# Patient Record
Sex: FEMALE | Race: WHITE | NOT HISPANIC OR LATINO
[De-identification: names, ages, dates, MRNs, and addresses within clinical notes are randomized per-mention and may not be internally consistent; named-entity substitution may affect disease eponyms.]

---
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
## 2025-01-20 ENCOUNTER — EMERGENCY (EMERGENCY)
Facility: HOSPITAL | Age: 20
LOS: 1 days | Discharge: HOME | End: 2025-01-20
Attending: EMERGENCY MEDICINE | Admitting: EMERGENCY MEDICINE
Payer: MEDICAID

## 2025-01-20 VITALS
DIASTOLIC BLOOD PRESSURE: 73 MMHG | RESPIRATION RATE: 18 BRPM | SYSTOLIC BLOOD PRESSURE: 108 MMHG | HEIGHT: 60 IN | WEIGHT: 100.09 LBS | OXYGEN SATURATION: 100 % | TEMPERATURE: 97 F | HEART RATE: 84 BPM

## 2025-01-20 LAB
ALBUMIN SERPL ELPH-MCNC: 3.8 G/DL — SIGNIFICANT CHANGE UP (ref 3.3–5)
ALP SERPL-CCNC: 80 U/L — SIGNIFICANT CHANGE UP (ref 40–120)
ALT FLD-CCNC: 14 U/L — SIGNIFICANT CHANGE UP (ref 10–45)
ANION GAP SERPL CALC-SCNC: 12 MMOL/L — SIGNIFICANT CHANGE UP (ref 5–17)
APAP SERPL-MCNC: <1 UG/ML — LOW (ref 10–30)
AST SERPL-CCNC: 14 U/L — SIGNIFICANT CHANGE UP (ref 10–40)
BASOPHILS # BLD AUTO: 0.07 K/UL — SIGNIFICANT CHANGE UP (ref 0–0.2)
BASOPHILS NFR BLD AUTO: 0.6 % — SIGNIFICANT CHANGE UP (ref 0–2)
BILIRUB SERPL-MCNC: 0.5 MG/DL — SIGNIFICANT CHANGE UP (ref 0.2–1.2)
BUN SERPL-MCNC: 15 MG/DL — SIGNIFICANT CHANGE UP (ref 7–23)
CALCIUM SERPL-MCNC: 9.4 MG/DL — SIGNIFICANT CHANGE UP (ref 8.4–10.5)
CHLORIDE SERPL-SCNC: 104 MMOL/L — SIGNIFICANT CHANGE UP (ref 96–108)
CO2 SERPL-SCNC: 21 MMOL/L — LOW (ref 22–31)
CREAT SERPL-MCNC: 0.63 MG/DL — SIGNIFICANT CHANGE UP (ref 0.5–1.3)
EGFR: 131 ML/MIN/1.73M2 — SIGNIFICANT CHANGE UP
EOSINOPHIL # BLD AUTO: 0.22 K/UL — SIGNIFICANT CHANGE UP (ref 0–0.5)
EOSINOPHIL NFR BLD AUTO: 2 % — SIGNIFICANT CHANGE UP (ref 0–6)
ETHANOL SERPL-MCNC: <3 MG/DL — SIGNIFICANT CHANGE UP (ref 0–3)
FLUAV AG NPH QL: SIGNIFICANT CHANGE UP
FLUBV AG NPH QL: SIGNIFICANT CHANGE UP
GLUCOSE SERPL-MCNC: 101 MG/DL — HIGH (ref 70–99)
HCG SERPL-ACNC: <1 MIU/ML — SIGNIFICANT CHANGE UP
HCT VFR BLD CALC: 36.8 % — SIGNIFICANT CHANGE UP (ref 34.5–45)
HGB BLD-MCNC: 12.3 G/DL — SIGNIFICANT CHANGE UP (ref 11.5–15.5)
IMM GRANULOCYTES NFR BLD AUTO: 0.4 % — SIGNIFICANT CHANGE UP (ref 0–0.9)
LYMPHOCYTES # BLD AUTO: 2.38 K/UL — SIGNIFICANT CHANGE UP (ref 1–3.3)
LYMPHOCYTES # BLD AUTO: 21.6 % — SIGNIFICANT CHANGE UP (ref 13–44)
MCHC RBC-ENTMCNC: 28.3 PG — SIGNIFICANT CHANGE UP (ref 27–34)
MCHC RBC-ENTMCNC: 33.4 G/DL — SIGNIFICANT CHANGE UP (ref 32–36)
MCV RBC AUTO: 84.6 FL — SIGNIFICANT CHANGE UP (ref 80–100)
MONOCYTES # BLD AUTO: 0.76 K/UL — SIGNIFICANT CHANGE UP (ref 0–0.9)
MONOCYTES NFR BLD AUTO: 6.9 % — SIGNIFICANT CHANGE UP (ref 2–14)
NEUTROPHILS # BLD AUTO: 7.54 K/UL — HIGH (ref 1.8–7.4)
NEUTROPHILS NFR BLD AUTO: 68.5 % — SIGNIFICANT CHANGE UP (ref 43–77)
NRBC # BLD: 0 /100 WBCS — SIGNIFICANT CHANGE UP (ref 0–0)
PLATELET # BLD AUTO: 406 K/UL — HIGH (ref 150–400)
POTASSIUM SERPL-MCNC: 3.7 MMOL/L — SIGNIFICANT CHANGE UP (ref 3.5–5.3)
POTASSIUM SERPL-SCNC: 3.7 MMOL/L — SIGNIFICANT CHANGE UP (ref 3.5–5.3)
PROT SERPL-MCNC: 7.2 G/DL — SIGNIFICANT CHANGE UP (ref 6–8.3)
RBC # BLD: 4.35 M/UL — SIGNIFICANT CHANGE UP (ref 3.8–5.2)
RBC # FLD: 14.5 % — SIGNIFICANT CHANGE UP (ref 10.3–14.5)
RSV RNA NPH QL NAA+NON-PROBE: SIGNIFICANT CHANGE UP
SALICYLATES SERPL-MCNC: 1 MG/DL — LOW (ref 3–20)
SARS-COV-2 RNA SPEC QL NAA+PROBE: SIGNIFICANT CHANGE UP
SODIUM SERPL-SCNC: 137 MMOL/L — SIGNIFICANT CHANGE UP (ref 135–145)
WBC # BLD: 11.01 K/UL — HIGH (ref 3.8–10.5)
WBC # FLD AUTO: 11.01 K/UL — HIGH (ref 3.8–10.5)

## 2025-01-20 PROCEDURE — 93005 ELECTROCARDIOGRAM TRACING: CPT

## 2025-01-20 PROCEDURE — 36415 COLL VENOUS BLD VENIPUNCTURE: CPT

## 2025-01-20 PROCEDURE — 85025 COMPLETE CBC W/AUTO DIFF WBC: CPT

## 2025-01-20 PROCEDURE — 80053 COMPREHEN METABOLIC PANEL: CPT

## 2025-01-20 PROCEDURE — 99285 EMERGENCY DEPT VISIT HI MDM: CPT

## 2025-01-20 PROCEDURE — 80307 DRUG TEST PRSMV CHEM ANLYZR: CPT

## 2025-01-20 PROCEDURE — 87637 SARSCOV2&INF A&B&RSV AMP PRB: CPT

## 2025-01-20 PROCEDURE — 90792 PSYCH DIAG EVAL W/MED SRVCS: CPT | Mod: 95

## 2025-01-20 PROCEDURE — 99285 EMERGENCY DEPT VISIT HI MDM: CPT | Mod: 25

## 2025-01-20 PROCEDURE — 84702 CHORIONIC GONADOTROPIN TEST: CPT

## 2025-01-20 PROCEDURE — 93010 ELECTROCARDIOGRAM REPORT: CPT

## 2025-01-20 NOTE — ED BEHAVIORAL HEALTH ASSESSMENT NOTE - DETAILS
n/a SW and provider spoke with mother denies father - schizoaffective disorder discussed with patient and reviewed with patient and mother. per chart review: lamictal w/ rash, prozac w/ syncopal episodes/hypotension; also allergic to amoxicillin, penicillin
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
## 2025-01-20 NOTE — ED BEHAVIORAL HEALTH ASSESSMENT NOTE - RISK ASSESSMENT
patient is at low/moderate risk of self harm. RF include hx of self-harm and suicide attempts, poor frustration tolerance, hx of substance use, irritability and impulsivity. PF which help mitigate this risk include no active suicidal ideation, future oriented, identifies reasons to live, strong support network, able to safety plan, access to care.

## 2025-01-20 NOTE — ED BEHAVIORAL HEALTH NOTE - BEHAVIORAL HEALTH NOTE
========================    FOR EACH COLLATERAL    ========================    NAME:    NUMBER:    RELATIONSHIP:    RELIABILITY:    COMMENTS:      ========================    PATIENT DEMOGRAPHICS:    ========================      ========================    HPI    ========================    BASELINE FUNCTIONING:    DATE HPI STARTED:    DECOMPENSATION:    SUICIDALITY    VIOLENCE:    SUBSTANCE:        ========================    PAST PSYCHIATRIC HISTORY    ========================    DATE PAST PSYCHIATRIC HISTORY STARTED:    MAIN PSYCHIATRIC DIAGNOSIS:    PSYCHIATRIC HOSPITALIZATIONS:    PRIOR ILLNESS:    SUICIDALITY:    VIOLENCE:    SUBSTANCE USE:      ==============    OTHER HISTORY    ==============    CURRENT MEDICATION:    MEDICAL HISTORY:    ALLERGIES    LEGAL ISSUES:    FIREARM ACCESS:    SOCIAL HISTORY:    FAMILY HISTORY:    DEVELOPMENTAL HISTORY:

## 2025-01-20 NOTE — ED PROVIDER NOTE - CLINICAL SUMMARY MEDICAL DECISION MAKING FREE TEXT BOX
00 20: Received signout from Dr. PatelJun [id number], follow-up telepsych consult, dispo per psych.  Spoke with attending telepsychiatrist Dr. Rowland, there is no indication for involuntary admission.  They have set up outpatient appointment with patient at [facility].  Father here to  patient.  Dr. Ivan

## 2025-01-20 NOTE — ED ADULT NURSE REASSESSMENT NOTE - NS ED NURSE REASSESS COMMENT FT1
Informed by MD Luong that collateral provided by family provided that patient made statements endorsing suicidal ideation. Patient continues to deny ideation or plan at present. Informed by MD Luong that she is to be assessed by TelePsych and plan of care for ED visit explained. Patient verbally and visibly upset. Patient changed into yellow gown and red socks at this time per protocol. Patient's belongings collected; x2 bags in security closet shelf. 1 to 1 initiated as ordered and remains at bedside. Verbal de-escalation and consoling provided. Informed by MD Luong that collateral provided by family provided that patient made statements endorsing suicidal ideation. Patient continues to deny ideation or plan at present. Patient informed by MD Luong that she is to be assessed by TelePsych and plan of care for ED visit explained with RN and MD present. Patient verbally and visibly upset. Patient changed into yellow gown and red socks at this time per protocol. Patient's belongings collected; x2 bags in security closet shelf. 1 to 1 initiated as ordered and remains at bedside. Verbal de-escalation and consoling provided. Informed by MD Luong that collateral provided by family stated that patient made statements endorsing suicidal ideation. Patient continues to deny ideation or plan at present. Patient informed by MD Luong that she is to be assessed by TelePsych and plan of care for ED visit explained with RN and MD present. Patient verbally and visibly upset. Patient changed into yellow gown and red socks at this time per protocol. Patient's belongings collected; x2 bags in security closet shelf. 1 to 1 initiated as ordered and remains at bedside. Verbal de-escalation and consoling provided.

## 2025-01-20 NOTE — ED BEHAVIORAL HEALTH ASSESSMENT NOTE - ADDITIONAL DETAILS ALL
prior overdose attempt in 2021 on hydroxyzine, 6-7 tabs; prior self-harm in 12/2024 in context of argument with boyfriend

## 2025-01-20 NOTE — ED BEHAVIORAL HEALTH ASSESSMENT NOTE - NSBHSABEN_PSY_A_CORE FT
hx use of street Xanax up to 6 mg daily, reports outpatient taper with private psychiatrist using 3 mg daily (unable to verify on ISTOP)

## 2025-01-20 NOTE — ED ADULT TRIAGE NOTE - CHIEF COMPLAINT QUOTE
Patient brought in by [facility] after having a argument with mother and having anxiety. Denies any SI/HI or auditory or visual hallucinations
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
## 2025-01-20 NOTE — ED PROVIDER NOTE - PATIENT PORTAL LINK FT
You can access the FollowMyHealth Patient Portal offered by [facility] by registering at the following website: [email or web address]. By joining [employer]’s FollowMyHealth portal, you will also be able to view your health information using other applications (apps) compatible with our system.
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
## 2025-01-20 NOTE — ED PROVIDER NOTE - NSFOLLOWUPINSTRUCTIONS_ED_ALL_ED_FT
-Follow-up with your outpatient psychiatric appointment in [place] tomorrow as scheduled for you.  See information on papers given to you    Anxiety    WHAT YOU NEED TO KNOW:    Anxiety is a condition that causes you to feel extremely worried or nervous. The feelings are so strong that they can cause problems with your daily activities or sleep. Anxiety may be triggered by something you fear, or it may happen without a cause. Family or work stress, smoking, caffeine, and alcohol can increase your risk for anxiety. Certain medicines or health conditions can also increase your risk. Anxiety can become a long-term condition if it is not managed or treated.     DISCHARGE INSTRUCTIONS:    Call 911 if:     You have chest pain, tightness, or heaviness that may spread to your shoulders, arms, jaw, neck, or back.      You feel like hurting yourself or someone else.     Contact your healthcare provider if:     Your symptoms get worse or do not get better with treatment.      Your anxiety keeps you from doing your regular daily activities.      You have new symptoms since your last visit.      You have questions or concerns about your condition or care.    Medicines:     Medicines may be given to help you feel more calm and relaxed, and decrease your symptoms.      Take your medicine as directed. Contact your healthcare provider if you think your medicine is not helping or if you have side effects. Tell him of her if you are allergic to any medicine. Keep a list of the medicines, vitamins, and herbs you take. Include the amounts, and when and why you take them. Bring the list or the pill bottles to follow-up visits. Carry your medicine list with you in case of an emergency.    Follow up with your healthcare provider within 2 weeks or as directed: Write down your questions so you remember to ask them during your visits.    Manage anxiety:     Talk to someone about your anxiety. Your healthcare provider may suggest counseling. Cognitive behavioral therapy can help you understand and change how you react to events that trigger your symptoms. You might feel more comfortable talking with a friend or family member about your anxiety. Choose someone you know will be supportive and encouraging.      Find ways to relax. Activities such as exercise, meditation, or listening to music can help you relax. Spend time with friends, or do things you enjoy.      Practice deep breathing. Deep breathing can help you relax when you feel anxious. Focus on taking slow, deep breaths several times a day, or during an anxiety attack. Breathe in through your nose and out through your mouth.       Create a regular sleep routine. Regular sleep can help you feel calmer during the day. Go to sleep and wake up at the same times every day. Do not watch television or use the computer right before bed. Your room should be comfortable, dark, and quiet.       Eat a variety of healthy foods. Healthy foods include fruits, vegetables, low-fat dairy products, lean meats, fish, whole-grain breads, and cooked beans. Healthy foods can help you feel less anxious and have more energy.      Exercise regularly. Exercise can increase your energy level. Exercise may also lift your mood and help you sleep better. Your healthcare provider can help you create an exercise plan.      Do not smoke. Nicotine and other chemicals in cigarettes and cigars can increase anxiety. Ask your healthcare provider for information if you currently smoke and need help to quit. E-cigarettes or smokeless tobacco still contain nicotine. Talk to your healthcare provider before you use these products.       Do not have caffeine. Caffeine can make your symptoms worse. Do not have foods or drinks that are meant to increase your energy level.      Limit or do not drink alcohol. Ask your healthcare provider if alcohol is safe for you. You may not be able to drink alcohol if you take certain anxiety or depression medicines. Limit alcohol to 1 drink per day if you are a woman. Limit alcohol to 2 drinks per day if you are a man. A drink of alcohol is 12 ounces of beer, 5 ounces of wine, or 1½ ounces of liquor.       Do not use drugs. Drugs can make your anxiety worse. It can also make anxiety hard to manage. Talk to your healthcare provider if you use drugs and want help to quit.      Depression    WHAT YOU NEED TO KNOW:    Depression is a medical condition that causes feelings of sadness or hopelessness that do not go away. Depression may cause you to lose interest in things you used to enjoy. These feelings may interfere with your daily life.    DISCHARGE INSTRUCTIONS:    Call your local emergency number (911 in the [place]) if:   •You think about harming yourself or someone else.      •You have done something on purpose to hurt yourself.      Call your therapist or doctor if:   •Your symptoms do not improve.      •You cannot make it to your next appointment.       •You have new symptoms.      •You have questions or concerns about your condition or care.      The following resources are available at any time to help you, if needed:   •For the National Suicide Prevention Lifeline, call either of the following: ?988      ?1-460.792.7242 (1-800-273-TALK)      •For the Suicide Hotline, call 1-278.579.7605 (6-269-HAWFBEO)      •For a list of international numbers: https://save.org/find-help/international-resources/      Medicines:   •Antidepressants may be given to improve or balance your mood. You may need to take this medicine for several weeks before you begin to feel better.      •Take your medicine as directed. Contact your healthcare provider if you think your medicine is not helping or if you have side effects. Tell him or her if you are allergic to any medicine. Keep a list of the medicines, vitamins, and herbs you take. Include the amounts, and when and why you take them. Bring the list or the pill bottles to follow-up visits. Carry your medicine list with you in case of an emergency.      Therapy is often used together with medicine to relieve depression. Therapy is a way for you to talk about your feelings and anything that may be causing depression. Therapy can be done alone or in a group. It may also be done with family members or a significant other.    Self-care:   •Get regular physical activity. Try to be active for 30 minutes, 3 to 5 days a week. Physical activity can help relieve depression. Work with your healthcare provider to develop a plan that you enjoy. It may help to ask someone to be active with you.      •Create a regular sleep schedule. A routine can help you relax before bed. Listen to music, read, or do yoga. Try to go to bed and wake up at the same time every day. Sleep is important for emotional health.      •Eat a variety of healthy foods. Healthy foods include fruits, vegetables, whole-grain breads, low-fat dairy products, lean meats, fish, and cooked beans. A healthy meal plan is low in fat, salt, and added sugar.      •Do not drink alcohol or use drugs. Alcohol and drugs can make depression worse. Talk to your therapist or doctor if you need help quitting.      Follow up with your healthcare provider as directed: Your healthcare provider will monitor your progress at follow-up visits. He or she will also monitor your medicine if you take antidepressants. Your healthcare provider will ask if the medicine is helping. Tell him or her about any side effects or problems you may have with your medicine. The type or amount of medicine may need to be changed. Write down your questions so you remember to ask them during your visits

## 2025-01-20 NOTE — ED ADULT NURSE NOTE - NS_ED_NURSE_TEACHING_TOPIC_ED_A_ED
Appointment information provided. Follow up with Primary Care Physician. For new or worsening symptoms, please return to Emergency Department.

## 2025-01-20 NOTE — ED BEHAVIORAL HEALTH ASSESSMENT NOTE - OTHER PAST PSYCHIATRIC HISTORY (INCLUDE DETAILS REGARDING ONSET, COURSE OF ILLNESS, INPATIENT/OUTPATIENT TREATMENT)
Substance use hx opiate use disorder in sustained remission (since 9/2024), cocaine use disorder in early remission (1/2025), hx benzodiazepine dependence, cannabis dependence, PPH borderline personality disorder, unspecified bipolar disorder, PTSD, LUCIA, ADHD, MDD, two prior inpatient hospitalizations (most recently at [facility] in 2021 after overdose attempt); most recently in treatment with [facility] (per BONI in 2023), reportedly receiving care from private psychiatrist

## 2025-01-20 NOTE — ED BEHAVIORAL HEALTH ASSESSMENT NOTE - DESCRIPTION
Pt in good behavioral control, appropriate, was sleeping prior to interview    Vital Signs Last 24 Hrs  T(C): 36.2 (20 Jan 2025 19:17), Max: 36.2 (20 Jan 2025 19:17)  T(F): 97.2 (20 Jan 2025 19:17), Max: 97.2 (20 Jan 2025 19:17)  HR: 84 (20 Jan 2025 19:17) (84 - 84)  BP: 108/73 (20 Jan 2025 19:17) (108/73 - 108/73)  BP(mean): --  RR: 18 (20 Jan 2025 19:17) (18 - 18)  SpO2: 100% (20 Jan 2025 19:17) (100% - 100%)    Parameters below as of 20 Jan 2025 19:17  Patient On (Oxygen Delivery Method): room air None known Domiciled with parents and pets, graduated from , currently not in school, unemployed, interested in [employer] industry Domiciled with parents and pets, graduated from , currently not in school, unemployed, 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

## 2025-01-20 NOTE — ED BEHAVIORAL HEALTH ASSESSMENT NOTE - NS ED BHA DEMOGRAPHICS MEDICAL RECORD REVIEWED YES RECORDS
Hospital chart/[facility]/Psyckes Soolantra Pregnancy And Lactation Text: This medication is Pregnancy Category C. This medication is considered safe during breast feeding.

## 2025-01-20 NOTE — ED BEHAVIORAL HEALTH ASSESSMENT NOTE - DIFFERENTIAL
borderline personality disorder, polysubstance use disorder, cocaine withdrawal; r/o bipolar disorder

## 2025-01-20 NOTE — ED ADULT NURSE NOTE - OBJECTIVE STATEMENT
Patient received A&Ox4, ambulatory with steady gait at the present. Patient complains of new onset of anxiety s/p argument with mother. Patient with PMHx BPD, borderline personality. Patient calm and cooperative at present. Patient denies SI/HI/AH/VH. Side rails up, call light in reach, safety maintained, comfort measures provided and MD evaluation in progress.

## 2025-01-21 VITALS
OXYGEN SATURATION: 99 % | HEART RATE: 69 BPM | RESPIRATION RATE: 20 BRPM | DIASTOLIC BLOOD PRESSURE: 68 MMHG | TEMPERATURE: 97 F | SYSTOLIC BLOOD PRESSURE: 108 MMHG

## 2025-01-21 DIAGNOSIS — F60.3 BORDERLINE PERSONALITY DISORDER: ICD-10-CM

## 2025-01-21 DIAGNOSIS — F14.93 COCAINE USE, UNSPECIFIED WITH WITHDRAWAL: ICD-10-CM

## 2025-01-21 NOTE — BH SAFETY PLAN - STEP 3 DISTRACTION NAME
How Severe Are They?: mild Is This A New Presentation, Or A Follow-Up?: Skin Lesion . no history of blood product transfusion

## 2025-03-02 ENCOUNTER — EMERGENCY (EMERGENCY)
Facility: HOSPITAL | Age: 20
LOS: 1 days | Discharge: HOME | End: 2025-03-02
Attending: EMERGENCY MEDICINE | Admitting: STUDENT IN AN ORGANIZED HEALTH CARE EDUCATION/TRAINING PROGRAM
Payer: MEDICAID

## 2025-03-02 VITALS
TEMPERATURE: 98 F | SYSTOLIC BLOOD PRESSURE: 98 MMHG | WEIGHT: 89.95 LBS | HEART RATE: 65 BPM | OXYGEN SATURATION: 97 % | DIASTOLIC BLOOD PRESSURE: 67 MMHG | RESPIRATION RATE: 18 BRPM | HEIGHT: 60 IN

## 2025-03-02 LAB
ALBUMIN SERPL ELPH-MCNC: 3.6 G/DL — SIGNIFICANT CHANGE UP (ref 3.3–5)
ALP SERPL-CCNC: 74 U/L — SIGNIFICANT CHANGE UP (ref 40–120)
ALT FLD-CCNC: 15 U/L — SIGNIFICANT CHANGE UP (ref 10–45)
ANION GAP SERPL CALC-SCNC: 7 MMOL/L — SIGNIFICANT CHANGE UP (ref 5–17)
APPEARANCE UR: ABNORMAL
AST SERPL-CCNC: 16 U/L — SIGNIFICANT CHANGE UP (ref 10–40)
BACTERIA # UR AUTO: ABNORMAL /HPF
BASOPHILS # BLD AUTO: 0.03 K/UL — SIGNIFICANT CHANGE UP (ref 0–0.2)
BASOPHILS NFR BLD AUTO: 0.2 % — SIGNIFICANT CHANGE UP (ref 0–2)
BILIRUB SERPL-MCNC: 0.2 MG/DL — SIGNIFICANT CHANGE UP (ref 0.2–1.2)
BILIRUB UR-MCNC: NEGATIVE — SIGNIFICANT CHANGE UP
BUN SERPL-MCNC: 15 MG/DL — SIGNIFICANT CHANGE UP (ref 7–23)
CALCIUM SERPL-MCNC: 8.8 MG/DL — SIGNIFICANT CHANGE UP (ref 8.4–10.5)
CHLORIDE SERPL-SCNC: 104 MMOL/L — SIGNIFICANT CHANGE UP (ref 96–108)
CO2 SERPL-SCNC: 25 MMOL/L — SIGNIFICANT CHANGE UP (ref 22–31)
COLOR SPEC: YELLOW — SIGNIFICANT CHANGE UP
CREAT SERPL-MCNC: 0.56 MG/DL — SIGNIFICANT CHANGE UP (ref 0.5–1.3)
DIFF PNL FLD: NEGATIVE — SIGNIFICANT CHANGE UP
EGFR: 134 ML/MIN/1.73M2 — SIGNIFICANT CHANGE UP
EOSINOPHIL # BLD AUTO: 0.01 K/UL — SIGNIFICANT CHANGE UP (ref 0–0.5)
EOSINOPHIL NFR BLD AUTO: 0.1 % — SIGNIFICANT CHANGE UP (ref 0–6)
EPI CELLS # UR: PRESENT
GLUCOSE SERPL-MCNC: 115 MG/DL — HIGH (ref 70–99)
GLUCOSE UR QL: NEGATIVE MG/DL — SIGNIFICANT CHANGE UP
HCG SERPL-ACNC: <1 MIU/ML — SIGNIFICANT CHANGE UP
HCT VFR BLD CALC: 34 % — LOW (ref 34.5–45)
HGB BLD-MCNC: 11.4 G/DL — LOW (ref 11.5–15.5)
IMM GRANULOCYTES NFR BLD AUTO: 0.5 % — SIGNIFICANT CHANGE UP (ref 0–0.9)
KETONES UR-MCNC: 15 MG/DL
LEUKOCYTE ESTERASE UR-ACNC: NEGATIVE — SIGNIFICANT CHANGE UP
LIDOCAIN IGE QN: 52 U/L — SIGNIFICANT CHANGE UP (ref 16–77)
LYMPHOCYTES # BLD AUTO: 1.04 K/UL — SIGNIFICANT CHANGE UP (ref 1–3.3)
LYMPHOCYTES # BLD AUTO: 7.9 % — LOW (ref 13–44)
MCHC RBC-ENTMCNC: 28.9 PG — SIGNIFICANT CHANGE UP (ref 27–34)
MCHC RBC-ENTMCNC: 33.5 G/DL — SIGNIFICANT CHANGE UP (ref 32–36)
MCV RBC AUTO: 86.1 FL — SIGNIFICANT CHANGE UP (ref 80–100)
MONOCYTES # BLD AUTO: 0.24 K/UL — SIGNIFICANT CHANGE UP (ref 0–0.9)
MONOCYTES NFR BLD AUTO: 1.8 % — LOW (ref 2–14)
NEUTROPHILS # BLD AUTO: 11.82 K/UL — HIGH (ref 1.8–7.4)
NEUTROPHILS NFR BLD AUTO: 89.5 % — HIGH (ref 43–77)
NITRITE UR-MCNC: NEGATIVE — SIGNIFICANT CHANGE UP
NRBC BLD AUTO-RTO: 0 /100 WBCS — SIGNIFICANT CHANGE UP (ref 0–0)
PH UR: 7.5 — SIGNIFICANT CHANGE UP (ref 5–8)
PLATELET # BLD AUTO: 416 K/UL — HIGH (ref 150–400)
POTASSIUM SERPL-MCNC: 3.7 MMOL/L — SIGNIFICANT CHANGE UP (ref 3.5–5.3)
POTASSIUM SERPL-SCNC: 3.7 MMOL/L — SIGNIFICANT CHANGE UP (ref 3.5–5.3)
PROT SERPL-MCNC: 6.8 G/DL — SIGNIFICANT CHANGE UP (ref 6–8.3)
PROT UR-MCNC: NEGATIVE MG/DL — SIGNIFICANT CHANGE UP
RBC # BLD: 3.95 M/UL — SIGNIFICANT CHANGE UP (ref 3.8–5.2)
RBC # FLD: 13.8 % — SIGNIFICANT CHANGE UP (ref 10.3–14.5)
RBC CASTS # UR COMP ASSIST: 0 /HPF — SIGNIFICANT CHANGE UP (ref 0–4)
SODIUM SERPL-SCNC: 136 MMOL/L — SIGNIFICANT CHANGE UP (ref 135–145)
SP GR SPEC: 1.03 — SIGNIFICANT CHANGE UP (ref 1–1.03)
UROBILINOGEN FLD QL: 0.2 MG/DL — SIGNIFICANT CHANGE UP (ref 0.2–1)
WBC # BLD: 13.2 K/UL — HIGH (ref 3.8–10.5)
WBC # FLD AUTO: 13.2 K/UL — HIGH (ref 3.8–10.5)
WBC UR QL: 1 /HPF — SIGNIFICANT CHANGE UP (ref 0–5)

## 2025-03-02 PROCEDURE — 74177 CT ABD & PELVIS W/CONTRAST: CPT | Mod: 26

## 2025-03-02 PROCEDURE — 99223 1ST HOSP IP/OBS HIGH 75: CPT | Mod: GC

## 2025-03-02 PROCEDURE — 99285 EMERGENCY DEPT VISIT HI MDM: CPT

## 2025-03-02 RX ORDER — ONDANSETRON HCL/PF 4 MG/2 ML
4 VIAL (ML) INJECTION ONCE
Refills: 0 | Status: COMPLETED | OUTPATIENT
Start: 2025-03-02 | End: 2025-03-02

## 2025-03-02 RX ORDER — HALOPERIDOL 10 MG/1
1 TABLET ORAL ONCE
Refills: 0 | Status: COMPLETED | OUTPATIENT
Start: 2025-03-02 | End: 2025-03-02

## 2025-03-02 RX ORDER — METOCLOPRAMIDE HCL 10 MG
10 TABLET ORAL ONCE
Refills: 0 | Status: COMPLETED | OUTPATIENT
Start: 2025-03-02 | End: 2025-03-02

## 2025-03-02 RX ADMIN — Medication 4 MILLIGRAM(S): at 18:06

## 2025-03-02 RX ADMIN — Medication 2000 MILLILITER(S): at 19:06

## 2025-03-02 RX ADMIN — Medication 1000 MILLILITER(S): at 20:13

## 2025-03-02 RX ADMIN — Medication 20 MILLIGRAM(S): at 18:06

## 2025-03-02 RX ADMIN — Medication 2000 MILLILITER(S): at 18:06

## 2025-03-02 RX ADMIN — Medication 1000 MILLILITER(S): at 19:13

## 2025-03-02 RX ADMIN — Medication 10 MILLIGRAM(S): at 19:34

## 2025-03-02 RX ADMIN — HALOPERIDOL 1 MILLIGRAM(S): 10 TABLET ORAL at 22:46

## 2025-03-02 NOTE — ED ADULT NURSE NOTE - OBJECTIVE STATEMENT
PAtient presents to ED with complaint of nausea and vomiting x 2 hours. EMS gave zofran 4m IM. Alert and oriented x 4.

## 2025-03-02 NOTE — ED PROVIDER NOTE - PROGRESS NOTE DETAILS
signed out from the day team for po challenge and re-eval after multiple antiemetics, patient failed po challenge, tried haldol, unable to tolerate po, will admit for IV hydration and further antiemetics

## 2025-03-02 NOTE — ED PROVIDER NOTE - OBJECTIVE STATEMENT
20-year-old female presents to the emergency department reporting nausea vomiting and abdominal pain.  Patient states it started today.  No reported fever.  Family is at bedside.  No known sick contacts.  According to records, patient has history of possibly bipolar disorder and substance abuse.  Patient denies drug use other than marijuana.  No recent alcohol.  No diarrhea reported.  1 family member in the room states that patient has this from time to time.  Has not seen a gastroenterologist.

## 2025-03-02 NOTE — ED PROVIDER NOTE - PHYSICAL EXAMINATION
Vitals: I have reviewed the patients vital signs  General: nontoxic appearing  HEENT: Atraumatic, normocephalic, airway patent  Eyes: EOMI, tracking appropriately  Neck: no tracheal deviation  Chest/Lungs: no trauma, symmetric chest rise, speaking in complete sentences,  no resp distress, clear lungs b/l  Heart: skin and extremities well perfused, regular rate and rhythm  Abd: Soft, Tender epigastrum and RLQ, no distention. +actively vomiting biliary fluid. No blood.   Neuro: A+Ox3, ambulating without difficulty, appears non focal  MSK: strength at baseline in all extremities, no muscle wasting or atrophy  Skin: no cyanosis, no jaundice

## 2025-03-02 NOTE — ED ADULT NURSE NOTE - NSFALLUNIVINTERV_ED_ALL_ED
Bed/Stretcher in lowest position, wheels locked, appropriate side rails in place/Call bell, personal items and telephone in reach/Instruct patient to call for assistance before getting out of bed/chair/stretcher/Non-slip footwear applied when patient is off stretcher/[place] to call system/Physically safe environment - no spills, clutter or unnecessary equipment/Purposeful proactive rounding/Room/bathroom lighting operational, light cord in reach

## 2025-03-02 NOTE — ED PROVIDER NOTE - CLINICAL SUMMARY MEDICAL DECISION MAKING FREE TEXT BOX
20-year-old female presents to the emergency department reporting nausea vomiting and abdominal pain.  Patient states it started today.  No reported fever.  Family is at bedside.  No known sick contacts.  According to records, patient has history of possibly bipolar disorder and substance abuse.  Patient denies drug use other than marijuana.  No recent alcohol.  No diarrhea reported.  1 family member in the room states that patient has this from time to time.  Has not seen a gastroenterologist. 20-year-old female presents to the emergency department reporting nausea vomiting and abdominal pain.  Patient states it started today.  No reported fever.  Family is at bedside.  No known sick contacts.  According to records, patient has history of possibly bipolar disorder and substance abuse.  Patient denies drug use other than marijuana.  No recent alcohol.  No diarrhea reported.  1 family member in the room states that patient has this from time to time.  Has not seen a gastroenterologist.    RLQ tenderness: plan for labs w CT to r/o Appy. Consideration of cyclical vomiting given history of similar and cannabis use. Will reassess.   Patient signed out to incoming physician.  All decisions regarding the progression of care will be made at their discretion.

## 2025-03-03 ENCOUNTER — TRANSCRIPTION ENCOUNTER (OUTPATIENT)
Age: 20
End: 2025-03-03

## 2025-03-03 VITALS
HEART RATE: 62 BPM | OXYGEN SATURATION: 95 % | TEMPERATURE: 97 F | DIASTOLIC BLOOD PRESSURE: 53 MMHG | RESPIRATION RATE: 17 BRPM | SYSTOLIC BLOOD PRESSURE: 106 MMHG

## 2025-03-03 DIAGNOSIS — F41.1 GENERALIZED ANXIETY DISORDER: ICD-10-CM

## 2025-03-03 DIAGNOSIS — F15.20 OTHER STIMULANT DEPENDENCE, UNCOMPLICATED: ICD-10-CM

## 2025-03-03 DIAGNOSIS — F15.90 OTHER STIMULANT USE, UNSPECIFIED, UNCOMPLICATED: ICD-10-CM

## 2025-03-03 DIAGNOSIS — F13.10 SEDATIVE, HYPNOTIC OR ANXIOLYTIC ABUSE, UNCOMPLICATED: ICD-10-CM

## 2025-03-03 DIAGNOSIS — F12.90 CANNABIS USE, UNSPECIFIED, UNCOMPLICATED: ICD-10-CM

## 2025-03-03 DIAGNOSIS — F11.90 OPIOID USE, UNSPECIFIED, UNCOMPLICATED: ICD-10-CM

## 2025-03-03 DIAGNOSIS — F60.89 OTHER SPECIFIC PERSONALITY DISORDERS: ICD-10-CM

## 2025-03-03 DIAGNOSIS — F19.94 OTHER PSYCHOACTIVE SUBSTANCE USE, UNSPECIFIED WITH PSYCHOACTIVE SUBSTANCE-INDUCED MOOD DISORDER: ICD-10-CM

## 2025-03-03 LAB
ALBUMIN SERPL ELPH-MCNC: 3.1 G/DL — LOW (ref 3.3–5)
ALP SERPL-CCNC: 68 U/L — SIGNIFICANT CHANGE UP (ref 40–120)
ALT FLD-CCNC: 12 U/L — SIGNIFICANT CHANGE UP (ref 10–45)
AMPHET UR-MCNC: NEGATIVE — SIGNIFICANT CHANGE UP
ANION GAP SERPL CALC-SCNC: 9 MMOL/L — SIGNIFICANT CHANGE UP (ref 5–17)
AST SERPL-CCNC: 14 U/L — SIGNIFICANT CHANGE UP (ref 10–40)
BARBITURATES UR SCN-MCNC: NEGATIVE — SIGNIFICANT CHANGE UP
BENZODIAZ UR-MCNC: POSITIVE
BILIRUB SERPL-MCNC: 0.4 MG/DL — SIGNIFICANT CHANGE UP (ref 0.2–1.2)
BUN SERPL-MCNC: 10 MG/DL — SIGNIFICANT CHANGE UP (ref 7–23)
CALCIUM SERPL-MCNC: 8.3 MG/DL — LOW (ref 8.4–10.5)
CHLORIDE SERPL-SCNC: 106 MMOL/L — SIGNIFICANT CHANGE UP (ref 96–108)
CK SERPL-CCNC: 54 U/L — SIGNIFICANT CHANGE UP (ref 25–170)
CO2 SERPL-SCNC: 24 MMOL/L — SIGNIFICANT CHANGE UP (ref 22–31)
COCAINE METAB.OTHER UR-MCNC: POSITIVE
CREAT SERPL-MCNC: 0.66 MG/DL — SIGNIFICANT CHANGE UP (ref 0.5–1.3)
EGFR: 129 ML/MIN/1.73M2 — SIGNIFICANT CHANGE UP
GLUCOSE SERPL-MCNC: 88 MG/DL — SIGNIFICANT CHANGE UP (ref 70–99)
HCT VFR BLD CALC: 31.5 % — LOW (ref 34.5–45)
HGB BLD-MCNC: 10.5 G/DL — LOW (ref 11.5–15.5)
MAGNESIUM SERPL-MCNC: 1.9 MG/DL — SIGNIFICANT CHANGE UP (ref 1.6–2.6)
MCHC RBC-ENTMCNC: 28.8 PG — SIGNIFICANT CHANGE UP (ref 27–34)
MCHC RBC-ENTMCNC: 33.3 G/DL — SIGNIFICANT CHANGE UP (ref 32–36)
MCV RBC AUTO: 86.3 FL — SIGNIFICANT CHANGE UP (ref 80–100)
METHADONE UR-MCNC: NEGATIVE — SIGNIFICANT CHANGE UP
NRBC BLD AUTO-RTO: 0 /100 WBCS — SIGNIFICANT CHANGE UP (ref 0–0)
OPIATES UR-MCNC: NEGATIVE — SIGNIFICANT CHANGE UP
PCP SPEC-MCNC: SIGNIFICANT CHANGE UP
PCP UR-MCNC: NEGATIVE — SIGNIFICANT CHANGE UP
PLATELET # BLD AUTO: 363 K/UL — SIGNIFICANT CHANGE UP (ref 150–400)
POTASSIUM SERPL-MCNC: 3.4 MMOL/L — LOW (ref 3.5–5.3)
POTASSIUM SERPL-SCNC: 3.4 MMOL/L — LOW (ref 3.5–5.3)
PROT SERPL-MCNC: 6 G/DL — SIGNIFICANT CHANGE UP (ref 6–8.3)
RBC # BLD: 3.65 M/UL — LOW (ref 3.8–5.2)
RBC # FLD: 13.9 % — SIGNIFICANT CHANGE UP (ref 10.3–14.5)
SODIUM SERPL-SCNC: 139 MMOL/L — SIGNIFICANT CHANGE UP (ref 135–145)
THC UR QL: POSITIVE
WBC # BLD: 9.29 K/UL — SIGNIFICANT CHANGE UP (ref 3.8–10.5)
WBC # FLD AUTO: 9.29 K/UL — SIGNIFICANT CHANGE UP (ref 3.8–10.5)

## 2025-03-03 PROCEDURE — 80307 DRUG TEST PRSMV CHEM ANLYZR: CPT

## 2025-03-03 PROCEDURE — 82550 ASSAY OF CK (CPK): CPT

## 2025-03-03 PROCEDURE — 96361 HYDRATE IV INFUSION ADD-ON: CPT

## 2025-03-03 PROCEDURE — 74177 CT ABD & PELVIS W/CONTRAST: CPT | Mod: MC

## 2025-03-03 PROCEDURE — 83735 ASSAY OF MAGNESIUM: CPT

## 2025-03-03 PROCEDURE — 80053 COMPREHEN METABOLIC PANEL: CPT

## 2025-03-03 PROCEDURE — 81001 URINALYSIS AUTO W/SCOPE: CPT

## 2025-03-03 PROCEDURE — 90792 PSYCH DIAG EVAL W/MED SRVCS: CPT

## 2025-03-03 PROCEDURE — G0378: CPT

## 2025-03-03 PROCEDURE — 96376 TX/PRO/DX INJ SAME DRUG ADON: CPT

## 2025-03-03 PROCEDURE — 96374 THER/PROPH/DIAG INJ IV PUSH: CPT | Mod: XU

## 2025-03-03 PROCEDURE — 36415 COLL VENOUS BLD VENIPUNCTURE: CPT

## 2025-03-03 PROCEDURE — 99284 EMERGENCY DEPT VISIT MOD MDM: CPT | Mod: 25

## 2025-03-03 PROCEDURE — 85027 COMPLETE CBC AUTOMATED: CPT

## 2025-03-03 PROCEDURE — 84702 CHORIONIC GONADOTROPIN TEST: CPT

## 2025-03-03 PROCEDURE — 85025 COMPLETE CBC W/AUTO DIFF WBC: CPT

## 2025-03-03 PROCEDURE — 96375 TX/PRO/DX INJ NEW DRUG ADDON: CPT

## 2025-03-03 PROCEDURE — 83690 ASSAY OF LIPASE: CPT

## 2025-03-03 RX ORDER — MELATONIN 5 MG
3 TABLET ORAL AT BEDTIME
Refills: 0 | Status: ACTIVE | OUTPATIENT
Start: 2025-03-03 | End: 2026-01-30

## 2025-03-03 RX ORDER — INFLUENZA A VIRUS A/IDAHO/07/2018 (H1N1) ANTIGEN (MDCK CELL DERIVED, PROPIOLACTONE INACTIVATED, INFLUENZA A VIRUS A/INDIANA/08/2018 (H3N2) ANTIGEN (MDCK CELL DERIVED, PROPIOLACTONE INACTIVATED), INFLUENZA B VIRUS B/SINGAPORE/INFTT-16-0610/2016 ANTIGEN (MDCK CELL DERIVED, PROPIOLACTONE INACTIVATED), INFLUENZA B VIRUS B/IOWA/06/2017 ANTIGEN (MDCK CELL DERIVED, PROPIOLACTONE INACTIVATED) 15; 15; 15; 15 UG/.5ML; UG/.5ML; UG/.5ML; UG/.5ML
0.5 INJECTION, SUSPENSION INTRAMUSCULAR ONCE
Refills: 0 | Status: ACTIVE | OUTPATIENT
Start: 2025-03-03 | End: 2026-01-30

## 2025-03-03 RX ORDER — ONDANSETRON HCL/PF 4 MG/2 ML
4 VIAL (ML) INJECTION EVERY 6 HOURS
Refills: 0 | Status: ACTIVE | OUTPATIENT
Start: 2025-03-03 | End: 2026-01-30

## 2025-03-03 RX ORDER — ACETAMINOPHEN 500 MG/5ML
650 LIQUID (ML) ORAL EVERY 6 HOURS
Refills: 0 | Status: ACTIVE | OUTPATIENT
Start: 2025-03-03 | End: 2026-01-30

## 2025-03-03 RX ORDER — ONDANSETRON HCL/PF 4 MG/2 ML
4 VIAL (ML) INJECTION EVERY 6 HOURS
Refills: 0 | Status: DISCONTINUED | OUTPATIENT
Start: 2025-03-03 | End: 2025-03-03

## 2025-03-03 RX ADMIN — Medication 90 MILLILITER(S): at 08:57

## 2025-03-03 RX ADMIN — Medication 40 MILLIEQUIVALENT(S): at 08:57

## 2025-03-03 RX ADMIN — Medication 4 MILLIGRAM(S): at 09:11

## 2025-03-03 RX ADMIN — Medication 4 MILLIGRAM(S): at 03:44

## 2025-03-03 RX ADMIN — Medication 90 MILLILITER(S): at 03:44

## 2025-03-03 NOTE — BH CONSULTATION LIAISON ASSESSMENT NOTE - DESCRIPTION
Domiciled with parents and pets, graduated from , currently not in school, unemployed. Previously interested in music industry or addiction counseling- no current interests. Describes opinions regarding harm reduction strategies in school.

## 2025-03-03 NOTE — DISCHARGE NOTE PROVIDER - CARE PROVIDERS DIRECT ADDRESSES
[email or web address]
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
## 2025-03-03 NOTE — BH CONSULTATION LIAISON ASSESSMENT NOTE - SUMMARY
Patient is a 20y old  Female who presents with a chief complaint of nausea, vomiting.    Patient reports hx of polysubstance abuse, cluster b personality traits, unspecified mood disorder vs substance induced mood disorder, ADHD, LUCIA, and PTSD. Reports to recent substance use; and infrequently engaging in non suicidal self injurious behaviors - specifically for attention. Denies any current suicidal ideation, intent, or plan. Would like to be connected to outpatient psychiatric services.  Patient is a 20y old  Female who presents with a chief complaint of nausea, vomiting.    Patient reports hx of polysubstance abuse, cluster b personality traits, unspecified mood disorder vs substance induced mood disorder, ADHD, LUCIA, and PTSD. Reports recent substance use; and infrequently engaging in non suicidal self injurious behaviors - specifically for attention. Denies any current suicidal ideation, intent, or plan. Would like to be connected to outpatient psychiatric services.

## 2025-03-03 NOTE — PATIENT PROFILE ADULT - FALL HARM RISK - HARM RISK INTERVENTIONS

## 2025-03-03 NOTE — PROGRESS NOTE ADULT - SUBJECTIVE AND OBJECTIVE BOX
Patient is a 20y old  Female who presents with a chief complaint of nausea, vomiting    Patient seen and examined at bedside. No acute overnight events. Sleepy but arousable  Parents at bedside     ALLERGIES:  shellfish (Unknown)  amoxicillin (Anaphylaxis)  penicillin (Anaphylaxis)  Pistachios (Unknown)  Lamictal (Rash)    MEDICATIONS  (STANDING):  influenza   Vaccine 0.5 milliLiter(s) IntraMuscular once    MEDICATIONS  (PRN):  acetaminophen     Tablet .. 650 milliGRAM(s) Oral every 6 hours PRN Temp greater or equal to 38C (100.4F), Mild Pain (1 - 3)  melatonin 3 milliGRAM(s) Oral at bedtime PRN Insomnia  ondansetron   Disintegrating Tablet 4 milliGRAM(s) Oral every 6 hours PRN Nausea and/or Vomiting    Vital Signs Last 24 Hrs  T(F): 98.2 (03 Mar 2025 03:30), Max: 98.2 (03 Mar 2025 03:30)  HR: 63 (03 Mar 2025 03:30) (63 - 69)  BP: 96/62 (03 Mar 2025 06:36) (91/61 - 98/67)  RR: 17 (03 Mar 2025 03:30) (13 - 18)  SpO2: 98% (03 Mar 2025 03:30) (96% - 98%)  I&O's Summary    BMI (kg/m2): 17.6 (03-02-25 @ 17:00)    PHYSICAL EXAM:  General: NAD, thin young F  ENT: MMM, no tonsilar exudate  Neck: Supple, No JVD  Lungs: Clear to auscultation bilaterally, no wheezes. Good air entry bilaterally   Cardio: RRR, S1/S2, No murmurs  Abdomen: Soft, Nontender, Nondistended; Bowel sounds present  Extremities: No calf tenderness, No pitting edema    LABS:                        10.5   9.29  )-----------( 363      ( 03 Mar 2025 06:00 )             31.5       03-03    139  |  106  |  10  ----------------------------<  88  3.4   |  24  |  0.66    Ca    8.3      03 Mar 2025 06:00  Mg     1.9     03-03    TPro  6.0  /  Alb  3.1  /  TBili  0.4  /  DBili  x   /  AST  14  /  ALT  12  /  AlkPhos  68  03-03     Urinalysis Basic - ( 03 Mar 2025 06:00 )    Color: x / Appearance: x / SG: x / pH: x  Gluc: 88 mg/dL / Ketone: x  / Bili: x / Urobili: x   Blood: x / Protein: x / Nitrite: x   Leuk Esterase: x / RBC: x / WBC x   Sq Epi: x / Non Sq Epi: x / Bacteria: x    RADIOLOGY & ADDITIONAL TESTS:     Care Discussed with Consultants/Other Providers:

## 2025-03-03 NOTE — BH CONSULTATION LIAISON ASSESSMENT NOTE - DIFFERENTIAL
Latoya Kim presents today for   Chief Complaint   Patient presents with    Bronchiectasis without complication       Is someone accompanying this pt? No    Is the patient using any DME equipment during OV? No    -DME Company No    Depression Screenin/21/2024    10:34 AM   PHQ-9 Questionaire   Little interest or pleasure in doing things 0   Feeling down, depressed, or hopeless 1   Trouble falling or staying asleep, or sleeping too much 0   Feeling tired or having little energy 0   Poor appetite or overeating 0   Feeling bad about yourself - or that you are a failure or have let yourself or your family down 0   Trouble concentrating on things, such as reading the newspaper or watching television 0   Moving or speaking so slowly that other people could have noticed. Or the opposite - being so fidgety or restless that you have been moving around a lot more than usual 0   Thoughts that you would be better off dead, or of hurting yourself in some way 0   PHQ-9 Total Score 1   If you checked off any problems, how difficult have these problems made it for you to do your work, take care of things at home, or get along with other people? 0       Learning Assessment:    Failed to redirect to the Timeline version of the [employer] SmartLink.    Abuse Screening:         No data to display                Fall Risk    Failed to redirect to the Timeline version of the [employer] SmartLink.    Coordination of Care:    1. Have you been to the ER, urgent care clinic since your last visit? Hospitalized since your last visit? No    2. Have you seen or consulted any other health care providers outside of the [facility] since your last visit? Include any pap smears or colon screening. No    Medication list has been update per patient.    
  General: comfortable, no acute distress  HEENT: pupils reactive, sclera anicteric, EOM intact  Neck: No adenopathy or thyroid swelling, no JVD, supple  CVS: S1S2 no murmurs  RS: Mod AE bilaterally, no tactile fremitus or egophony, no accessory muscle use  Abd: soft, non tender, no hepatosplenomegaly  Neuro: non focal, awake, alert  Extrm: no leg edema, clubbing or cyanosis  Skin: no rash    Data review:     Hospital Outpatient Visit on 06/14/2024   Component Date Value Ref Range Status    LIPID PANEL 06/14/2024      Final    Cholesterol, Total 06/14/2024 220 (H)  <200 MG/DL Final    Triglycerides 06/14/2024 46  <150 MG/DL Final    Comment: The drugs N-acetylcysteine (NAC) and  Metamiszole have been found to cause falsely  low results in this chemical assay. Please  be sure to submit blood samples obtained  BEFORE administration of either of these  drugs to assure correct results.      HDL 06/14/2024 96 (H)  40 - 60 MG/DL Final    LDL Cholesterol 06/14/2024 114.8 (H)  0 - 100 MG/DL Final    VLDL Cholesterol Calculated 06/14/2024 9.2  MG/DL Final    Chol/HDL Ratio 06/14/2024 2.3  0 - 5.0   Final    Sodium 06/14/2024 133 (L)  136 - 145 mmol/L Final    Potassium 06/14/2024 4.2  3.5 - 5.5 mmol/L Final    Chloride 06/14/2024 98 (L)  100 - 111 mmol/L Final    CO2 06/14/2024 31  21 - 32 mmol/L Final    Anion Gap 06/14/2024 4  3.0 - 18 mmol/L Final    Glucose 06/14/2024 95  74 - 99 mg/dL Final    BUN 06/14/2024 21 (H)  7.0 - 18 MG/DL Final    Creatinine 06/14/2024 0.69  0.6 - 1.3 MG/DL Final    BUN/Creatinine Ratio 06/14/2024 30 (H)  12 - 20   Final    Est, Glom Filt Rate 06/14/2024 >90  >60 ml/min/1.73m2 Final    Comment:    Pediatric calculator link: https://www.kidney.org/professionals/kdoqi/gfr_calculatorped     These results are not intended for use in patients <18 years of age.     eGFR results are calculated without a race factor using  the 2021 CKD-EPI equation. Careful clinical correlation is recommended, 
- Polysubstance abuse  - Opioid use disorder  - Benzodiazepine use disorder  - Stimulant use disorder  - Substance induced mood disorder  - Cluster B personality traits (Histrionic, Narcissistic, & BPD).  - R/o bipolar disorder  - R/o ADHD

## 2025-03-03 NOTE — DISCHARGE NOTE PROVIDER - NSDCFUADDAPPT_GEN_ALL_CORE_FT
Patient will follow up with her PCP Dr. Nona Delong [phone] for an appointment once discharged. No appointment required, MD has walk-in hours.

## 2025-03-03 NOTE — BH CONSULTATION LIAISON ASSESSMENT NOTE - NSBHSABEN_PSY_A_CORE FT
Reports infrequent benzodiazepine use; recently used a few days ago but will not take more than 1mg daily.

## 2025-03-03 NOTE — BH CONSULTATION LIAISON ASSESSMENT NOTE - NSBHSAOPI_PSY_A_CORE FT
Reports long hx of misusing opiate pills and fentanyl. Last used fentanyl about 4 months ago; recently used opioid pills about 2 weeks ago (maybe 10mg oxy).

## 2025-03-03 NOTE — BH CONSULTATION LIAISON ASSESSMENT NOTE - OTHER PAST PSYCHIATRIC HISTORY (INCLUDE DETAILS REGARDING ONSET, COURSE OF ILLNESS, INPATIENT/OUTPATIENT TREATMENT)
Substance use hx opiate use disorder in sustained remission (since 9/2024), cocaine use disorder in early remission (1/2025), hx benzodiazepine dependence, cannabis dependence, PPH borderline personality disorder, unspecified bipolar disorder, PTSD, LUCIA, ADHD, MDD, two prior inpatient hospitalizations (most recently at [facility] in 2021 after overdose attempt); most recently in treatment with [facility] and [facility] (per BONI in 2023). Not currently in any treatment. Seen by telepsychiatry in the ED in December 2024 and January 2025.

## 2025-03-03 NOTE — BH CONSULTATION LIAISON ASSESSMENT NOTE - NSBHSACONSEQUENCE_PSY_A_CORE FT
Reports going to inpatient substance use rehab at [facility]. Did not find it helpful. Currently with NA.

## 2025-03-03 NOTE — BH CONSULTATION LIAISON ASSESSMENT NOTE - NSBHCONSULTRECOMMENDOTHER_PSY_A_CORE FT
- Patient reports that she is not interested in any inpatient psychiatric care including inpatient substance use rehab at this time. Motivational interviewing occurred and patient expresses that important things to her include obtaining a car for transportation, finding a job/career, going back to school, and achieving sobriety from opioids/cocaine.   - Patient expresses openness to DBT program and dual diagnosis program. Although she does not have a car, she is willing to use the train to go to services offered by [facility], as she does not want to attend services in [place].   - Patient denies wanting to engage in any non suicidal self injurious behaviors and denies any recent suicidal ideation, intent, or plan.   - List of resources discussed with patient and parents. See below for details.

## 2025-03-03 NOTE — BH CONSULTATION LIAISON ASSESSMENT NOTE - NSBHSAAMPHET_PSY_A_CORE FT
Reports infrequent meth use; believes it was laced with her cocaine when she last used in December 2024.

## 2025-03-03 NOTE — BH CONSULTATION LIAISON ASSESSMENT NOTE - NSICDXBHSECONDARYDX_PSY_ALL_CORE
Substance induced mood disorder   F19.94  Opioid use disorder   F11.90  Mild benzodiazepine use disorder   F13.10  Stimulant use disorder   F15.90  Cannabis use disorder   F12.90  Generalized anxiety disorder   F41.1  Cluster B personality disorder   F60.89

## 2025-03-03 NOTE — BH CONSULTATION LIAISON ASSESSMENT NOTE - NSBHREFERDETAILS_PSY_A_CORE_FT
hx of substance use disorder, BPD, borderline personality disorder, PTSD, LUCIA, ADHD, MDD, previously on medications, wants to start again

## 2025-03-03 NOTE — DISCHARGE NOTE NURSING/CASE MANAGEMENT/SOCIAL WORK - FINANCIAL ASSISTANCE
[facility] provides services at a reduced cost to those who are determined to be eligible through [facility]’s financial assistance program. Information regarding [facility]’s financial assistance program can be found by going to [email or web address] or by calling [phone].
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
## 2025-03-03 NOTE — BH CONSULTATION LIAISON ASSESSMENT NOTE - NSBHCONSULTFOLLOWAFTERCARE_PSY_A_CORE FT
Recommend outpatient programs. This includes DBT and dual diagnosis. Additionally, you can explore PTSD programs and career development services. See below:   [facility] DBT Program: [phone] for cluster B personality traits.   For Dual Diagnosis: [facility]: [phone] [facility] Project Outreach: [phone] [facility]: [phone]   For PTSD symptoms:    [facility] ([phone])   For Career Development Services:   [facility] Access VR and Career Development Services: [phone]

## 2025-03-03 NOTE — DISCHARGE NOTE PROVIDER - NSDCCPCAREPLAN_GEN_ALL_CORE_FT
PRINCIPAL DISCHARGE DIAGNOSIS  Diagnosis: Nausea and vomiting  Assessment and Plan of Treatment: due to marijuana use  please stop with illcit drug use      SECONDARY DISCHARGE DIAGNOSES  Diagnosis: History of substance use  Assessment and Plan of Treatment:

## 2025-03-03 NOTE — H&P ADULT - ATTENDING COMMENTS
I have personally seen and examined patient on the above date.  I discussed the case with Dr Serrano and I agree with findings and plan as detailed per note above, which I have amended where appropriate.    Superseding the above.  20 F hx of bipolar, histrionic personality d/o, PTSD, hx of fentanyl abuse now clean x4 months, chronic marijuana user pw intractable NV and abd pain  AP;   #cyclical vomiting likely sec to cannabis use  CTAP neg.   check utox  cont anti-emetics  prn ativan or haldol  IVFs.   monitor and replete lytes.  advanced diet as tolerated.  #Bipolar psych d/o   pt express desire to see psychiatrist to start meds    Parents at bedside updated  [facility] HIE rev  D/W Dr Ag GALARZA.

## 2025-03-03 NOTE — DISCHARGE NOTE NURSING/CASE MANAGEMENT/SOCIAL WORK - PATIENT PORTAL LINK FT
You can access the FollowMyHealth Patient Portal offered by [facility] by registering at the following website: [email or web address]. By joining [employer]’s FollowMyHealth portal, you will also be able to view your health information using other applications (apps) compatible with our system.
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
## 2025-03-03 NOTE — BH CONSULTATION LIAISON ASSESSMENT NOTE - CURRENT MEDICATION
MEDICATIONS  (STANDING):  influenza   Vaccine 0.5 milliLiter(s) IntraMuscular once    MEDICATIONS  (PRN):  acetaminophen     Tablet .. 650 milliGRAM(s) Oral every 6 hours PRN Temp greater or equal to 38C (100.4F), Mild Pain (1 - 3)  melatonin 3 milliGRAM(s) Oral at bedtime PRN Insomnia  ondansetron   Disintegrating Tablet 4 milliGRAM(s) Oral every 6 hours PRN Nausea and/or Vomiting

## 2025-03-03 NOTE — DISCHARGE NOTE NURSING/CASE MANAGEMENT/SOCIAL WORK - NSDCPEEMAIL_GEN_ALL_CORE
[facility] for Tobacco Control email [email or web address]

## 2025-03-03 NOTE — BH CONSULTATION LIAISON ASSESSMENT NOTE - NSBHCHARTREVIEWLAB_PSY_A_CORE FT
Drug Screen W/PCP, Urine: Done (03.03.25 @ 06:00)   Cocaine Metabolite, Urine: Positive (03.03.25 @ 06:00)   THC, Urine Qualitative: Positive  Benzodiazepine, Urine: Positive (03.03.25 @ 06:00)     Complete Blood Count (03.03.25 @ 06:00)   Auto NRBC: 0 /100 WBCs  WBC Count: 9.29 K/uL  RBC Count: 3.65 M/uL  Hemoglobin: 10.5 g/dL  Hematocrit: 31.5 %  Mean Cell Volume: 86.3 fl  Mean Cell Hemoglobin: 28.8 pg  Mean Cell Hemoglobin Conc: 33.3 g/dL  Red Cell Distrib Width: 13.9 %  Platelet Count - Automated: 363 K/uL    Comprehensive Metabolic Panel (03.03.25 @ 06:00)   Sodium: 139 mmol/L  Potassium: 3.4 mmol/L  Chloride: 106 mmol/L  Carbon Dioxide: 24 mmol/L  Anion Gap: 9 mmol/L  Blood Urea Nitrogen: 10 mg/dL  Creatinine: 0.66 mg/dL  Glucose: 88 mg/dL  Calcium: 8.3 mg/dL  Protein Total: 6.0 g/dL  Albumin: 3.1 g/dL  Bilirubin Total: 0.4 mg/dL  Alkaline Phosphatase: 68 U/L  Aspartate Aminotransferase (AST/SGOT): 14 U/L  Alanine Aminotransferase (ALT/SGPT): 12 U/L  eGFR: 129:

## 2025-03-03 NOTE — DISCHARGE NOTE NURSING/CASE MANAGEMENT/SOCIAL WORK - NSDCFUADDAPPT_GEN_ALL_CORE_FT
Patient will follow up with her PCP Dr. Nona Delong [phone] for an appointment once discharged. No appointment required, MD has walk-in hours.

## 2025-03-03 NOTE — DISCHARGE NOTE NURSING/CASE MANAGEMENT/SOCIAL WORK - NSDCPEWEB_GEN_ALL_CORE
[facility] for Tobacco Control website --- [email or web address] website --- [email or web address]

## 2025-03-03 NOTE — BH CONSULTATION LIAISON ASSESSMENT NOTE - RISK ASSESSMENT
Risk factors include hx of self-harm and suicide attempt, poor frustration tolerance, hx of substance use, irritability and impulsivity.     Protective factors which help mitigate this risk include no active suicidal ideation, future oriented, identifies reasons to live, strong support network, willingness to engage in psychiatric care, motivation to get better.

## 2025-03-03 NOTE — H&P ADULT - NSHPPHYSICALEXAM_GEN_ALL_CORE
GENERAL: Lying in bed, appears tired but NAD  HEAD: Atraumatic, Normocephalic  EYES: Eyes closed  ENT: Dry mucous membranes  CHEST/LUNG: Clear to auscultation bilaterally. No wheezing, crackles, or rhonchi. Unlabored respirations.  HEART: Regular rate and rhythm. S1 and S2. No murmurs.  ABDOMEN: Soft, Nontender, Nondistended. Bowel sounds present.   EXTREMITIES: No lower extremity edema  NERVOUS SYSTEM: Alert & Oriented X3, speech clear.   SKIN: No rashes, bruises, or other lesions GENERAL: Lying in bed, appears tired but NAD  HEAD: Atraumatic, Normocephalic  EYES: Eyes closed  ENT: Not assessed, pt did not want to open mouth due to nausea  CHEST/LUNG: Clear to auscultation bilaterally. No wheezing, crackles, or rhonchi. Unlabored respirations.  HEART: Regular rate and rhythm. S1 and S2. No murmurs.  ABDOMEN: Soft, Nontender, Nondistended. Bowel sounds present.   EXTREMITIES: No lower extremity edema  NERVOUS SYSTEM: Alert & Oriented X3, speech clear.   SKIN: No rashes, bruises, or other lesions
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
## 2025-03-03 NOTE — BH CONSULTATION LIAISON ASSESSMENT NOTE - NSBHCHARTREVIEWVS_PSY_A_CORE FT
Vital Signs Last 24 Hrs  T(C): 36.2 (03 Mar 2025 13:00), Max: 36.8 (03 Mar 2025 03:30)  T(F): 97.2 (03 Mar 2025 13:00), Max: 98.2 (03 Mar 2025 03:30)  HR: 62 (03 Mar 2025 13:00) (62 - 69)  BP: 106/53 (03 Mar 2025 13:00) (91/61 - 106/53)  BP(mean): 71 (03 Mar 2025 03:30) (71 - 71)  RR: 17 (03 Mar 2025 13:00) (13 - 18)  SpO2: 95% (03 Mar 2025 13:00) (95% - 98%)    Parameters below as of 03 Mar 2025 13:00  Patient On (Oxygen Delivery Method): room air

## 2025-03-03 NOTE — BH CONSULTATION LIAISON ASSESSMENT NOTE - ADDITIONAL DETAILS ALL
[Dry Eyes] : dryness of the eyes [Negative] : Eyes prior overdose attempt in 2021 on hydroxyzine, 6-7 tabs; prior self-harm in 1/2025 in context of argument with mom.

## 2025-03-03 NOTE — PROGRESS NOTE ADULT - ASSESSMENT
19 yo female with pmhx of substance use disorder (opiate, cocaine, benzodiazepine, cannabis), borderline personality disorder, bipolar disorder (unspecified type), PTSD, LUCIA presenting for vomiting. Admitted for:    #Vomiting, likely 2/2 cannabis use  -no nausea this morning   - zofran prn  - clear diet this morning     #Hypokalemia  - repelete     #Extensive psychiatric history  -Pt used to follow with psych, not currently on meds but wants to start meds again  -Psych consult    Updated family at bedside  DC after psych clearance/eval and if tolerating diet

## 2025-03-03 NOTE — SBIRT NOTE ADULT - NSSBIRTDRGBRIEFINTDET_GEN_A_CORE
SW consulted to complete SBIRT assessment. Pt denies alcohol use, however stated she is 4 months sober from substances (Fentanyl). Pt stated she is connected to a clinic in [place], however does not recall the name. Pt stated she is in the process of becoming a . Pt declined needing resources from [facility].

## 2025-03-03 NOTE — DISCHARGE NOTE PROVIDER - CARE PROVIDER_API CALL
Nona Delong  Family Medicine  [address] [place] [address], [facility] [address]  [place] [address]  Phone: [phone]  Fax: [phone]  Follow Up Time:

## 2025-06-13 NOTE — BH CONSULTATION LIAISON ASSESSMENT NOTE - NSBHHPIREASONCL_PSY_A_CORE
Noted, further recommendations with update in 2 weeks.    anxiety/depression/med management/other substance

## 2025-07-06 NOTE — ED PEDIATRIC NURSE NOTE - CHIEF COMPLAINT QUOTE
Patient presents to ED from home with headache, tremulousness, itching, and nausea. Patient states that she has an addiction to xanax and oxycontin but stopped taking these medications with intent to get clean and go to rehab. Patient last took 2mg xanax last night and has not taken oxycontin in 2-3 days.
Diagnosis/MOLST Discussed/Treatment Options